# Patient Record
Sex: FEMALE | Race: WHITE | URBAN - METROPOLITAN AREA
[De-identification: names, ages, dates, MRNs, and addresses within clinical notes are randomized per-mention and may not be internally consistent; named-entity substitution may affect disease eponyms.]

---

## 2021-04-29 ENCOUNTER — APPOINTMENT (RX ONLY)
Dept: URBAN - METROPOLITAN AREA CLINIC 52 | Facility: CLINIC | Age: 36
Setting detail: DERMATOLOGY
End: 2021-04-29

## 2021-04-29 DIAGNOSIS — B88.0 OTHER ACARIASIS: ICD-10-CM | Status: INADEQUATELY CONTROLLED

## 2021-04-29 PROCEDURE — ? PRESCRIPTION

## 2021-04-29 PROCEDURE — 99203 OFFICE O/P NEW LOW 30 MIN: CPT

## 2021-04-29 PROCEDURE — ? COUNSELING

## 2021-04-29 PROCEDURE — ? FULL BODY SKIN EXAM - DECLINED

## 2021-04-29 PROCEDURE — ? ADDITIONAL NOTES

## 2021-04-29 RX ORDER — AZELAIC ACID 0.15 G/G
1 GEL TOPICAL QD
Qty: 1 | Refills: 3 | Status: ERX | COMMUNITY
Start: 2021-04-29

## 2021-04-29 RX ORDER — IVERMECTIN 10 MG/G
1 CREAM TOPICAL QD
Qty: 1 | Refills: 3 | Status: ERX | COMMUNITY
Start: 2021-04-29

## 2021-04-29 RX ADMIN — IVERMECTIN 1: 10 CREAM TOPICAL at 00:00

## 2021-04-29 RX ADMIN — AZELAIC ACID 1: 0.15 GEL TOPICAL at 00:00

## 2021-04-29 ASSESSMENT — LOCATION SIMPLE DESCRIPTION DERM
LOCATION SIMPLE: RIGHT CHEEK
LOCATION SIMPLE: LEFT CHEEK

## 2021-04-29 ASSESSMENT — LOCATION DETAILED DESCRIPTION DERM
LOCATION DETAILED: RIGHT CENTRAL MALAR CHEEK
LOCATION DETAILED: LEFT CENTRAL MALAR CHEEK

## 2021-04-29 ASSESSMENT — LOCATION ZONE DERM: LOCATION ZONE: FACE

## 2021-04-29 NOTE — HPI: RASH
How Severe Is Your Rash?: mild
Is This A New Presentation, Or A Follow-Up?: Rash
Additional History: Patient reports her symptoms are worse when wearing a mask.

## 2021-04-29 NOTE — PROCEDURE: COUNSELING
Patient Specific Counseling (Will Not Stick From Patient To Patient): Discussed with patient moisture build up may be a factor in preventing lesions from improving. Patient should cleanse face twice daily with gentle cleanser and stick to topical regimen prescribed.
Detail Level: Simple